# Patient Record
Sex: FEMALE | Race: WHITE | NOT HISPANIC OR LATINO
[De-identification: names, ages, dates, MRNs, and addresses within clinical notes are randomized per-mention and may not be internally consistent; named-entity substitution may affect disease eponyms.]

---

## 2018-12-17 ENCOUNTER — TRANSCRIPTION ENCOUNTER (OUTPATIENT)
Age: 53
End: 2018-12-17

## 2018-12-17 ENCOUNTER — APPOINTMENT (OUTPATIENT)
Dept: ORTHOPEDIC SURGERY | Facility: CLINIC | Age: 53
End: 2018-12-17
Payer: COMMERCIAL

## 2018-12-17 VITALS — WEIGHT: 175 LBS | BODY MASS INDEX: 26.52 KG/M2 | RESPIRATION RATE: 16 BRPM | HEIGHT: 68 IN

## 2018-12-17 DIAGNOSIS — S62.115A NONDISPLACED FRACTURE OF TRIQUETRUM [CUNEIFORM] BONE, LEFT WRIST, INITIAL ENCOUNTER FOR CLOSED FRACTURE: ICD-10-CM

## 2018-12-17 DIAGNOSIS — S62.025A NONDISPLACED FRACTURE OF MIDDLE THIRD OF NAVICULAR [SCAPHOID] BONE OF LEFT WRIST, INITIAL ENCOUNTER FOR CLOSED FRACTURE: ICD-10-CM

## 2018-12-17 DIAGNOSIS — Z78.9 OTHER SPECIFIED HEALTH STATUS: ICD-10-CM

## 2018-12-17 DIAGNOSIS — Z87.09 PERSONAL HISTORY OF OTHER DISEASES OF THE RESPIRATORY SYSTEM: ICD-10-CM

## 2018-12-17 PROBLEM — Z00.00 ENCOUNTER FOR PREVENTIVE HEALTH EXAMINATION: Status: ACTIVE | Noted: 2018-12-17

## 2018-12-17 PROCEDURE — 99203 OFFICE O/P NEW LOW 30 MIN: CPT

## 2018-12-17 PROCEDURE — 73110 X-RAY EXAM OF WRIST: CPT | Mod: 50

## 2018-12-17 RX ORDER — MOMETASONE FUROATE 220 UG/1
220 INHALANT RESPIRATORY (INHALATION)
Refills: 0 | Status: ACTIVE | COMMUNITY

## 2018-12-21 ENCOUNTER — CLINICAL ADVICE (OUTPATIENT)
Age: 53
End: 2018-12-21

## 2022-01-14 ENCOUNTER — NON-APPOINTMENT (OUTPATIENT)
Age: 57
End: 2022-01-14

## 2022-01-14 ENCOUNTER — APPOINTMENT (OUTPATIENT)
Dept: ORTHOPEDIC SURGERY | Facility: CLINIC | Age: 57
End: 2022-01-14
Payer: COMMERCIAL

## 2022-01-14 VITALS — RESPIRATION RATE: 16 BRPM | WEIGHT: 175 LBS | HEIGHT: 68 IN | BODY MASS INDEX: 26.52 KG/M2

## 2022-01-14 PROCEDURE — 29085 APPL CAST HAND&LWR FOREARM: CPT | Mod: RT

## 2022-01-14 PROCEDURE — 99204 OFFICE O/P NEW MOD 45 MIN: CPT | Mod: 25

## 2022-01-14 PROCEDURE — 73110 X-RAY EXAM OF WRIST: CPT | Mod: RT

## 2022-01-14 RX ORDER — TIOTROPIUM BROMIDE 18 UG/1
18 CAPSULE ORAL; RESPIRATORY (INHALATION)
Refills: 0 | Status: ACTIVE | COMMUNITY

## 2022-01-14 RX ORDER — FLUTICASONE FUROATE 50 UG/1
POWDER RESPIRATORY (INHALATION)
Refills: 0 | Status: ACTIVE | COMMUNITY

## 2022-01-15 ENCOUNTER — TRANSCRIPTION ENCOUNTER (OUTPATIENT)
Age: 57
End: 2022-01-15

## 2022-01-18 ENCOUNTER — LABORATORY RESULT (OUTPATIENT)
Age: 57
End: 2022-01-18

## 2022-01-20 ENCOUNTER — TRANSCRIPTION ENCOUNTER (OUTPATIENT)
Age: 57
End: 2022-01-20

## 2022-01-20 RX ORDER — ACETAMINOPHEN AND CODEINE 300; 30 MG/1; MG/1
300-30 TABLET ORAL
Qty: 20 | Refills: 0 | Status: ACTIVE | COMMUNITY
Start: 2022-01-20 | End: 1900-01-01

## 2022-01-21 ENCOUNTER — APPOINTMENT (OUTPATIENT)
Dept: ORTHOPEDIC SURGERY | Facility: AMBULATORY SURGERY CENTER | Age: 57
End: 2022-01-21

## 2022-01-21 ENCOUNTER — OUTPATIENT (OUTPATIENT)
Dept: OUTPATIENT SERVICES | Facility: HOSPITAL | Age: 57
LOS: 1 days | Discharge: HOME | End: 2022-01-21
Payer: COMMERCIAL

## 2022-01-21 PROCEDURE — 25609 OPTX DST RD XART FX/EP SEP3+: CPT | Mod: RT

## 2022-01-21 DEVICE — IMPLANTABLE DEVICE: Type: IMPLANTABLE DEVICE | Site: RIGHT | Status: FUNCTIONAL

## 2022-01-21 DEVICE — GWIRE TROCAR TIP 1.35X150MM: Type: IMPLANTABLE DEVICE | Site: RIGHT | Status: FUNCTIONAL

## 2022-01-21 DEVICE — SCREW LOKG 2.4X16MM: Type: IMPLANTABLE DEVICE | Site: RIGHT | Status: FUNCTIONAL

## 2022-01-21 DEVICE — ARTHREX BB-TAK NON-THREADED: Type: IMPLANTABLE DEVICE | Site: RIGHT | Status: FUNCTIONAL

## 2022-01-21 DEVICE — SCREW CORT LPS 3.5X12MM: Type: IMPLANTABLE DEVICE | Site: RIGHT | Status: FUNCTIONAL

## 2022-01-21 DEVICE — SCREW CORT 3.5X14MM: Type: IMPLANTABLE DEVICE | Site: RIGHT | Status: FUNCTIONAL

## 2022-01-21 DEVICE — SCREW CORT 3.5X16MM: Type: IMPLANTABLE DEVICE | Site: RIGHT | Status: FUNCTIONAL

## 2022-02-04 ENCOUNTER — APPOINTMENT (OUTPATIENT)
Dept: ORTHOPEDIC SURGERY | Facility: CLINIC | Age: 57
End: 2022-02-04
Payer: COMMERCIAL

## 2022-02-04 PROCEDURE — 29085 APPL CAST HAND&LWR FOREARM: CPT | Mod: 58

## 2022-02-04 PROCEDURE — 73110 X-RAY EXAM OF WRIST: CPT | Mod: RT

## 2022-02-04 PROCEDURE — 99024 POSTOP FOLLOW-UP VISIT: CPT

## 2022-03-04 ENCOUNTER — LABORATORY RESULT (OUTPATIENT)
Age: 57
End: 2022-03-04

## 2022-03-04 ENCOUNTER — APPOINTMENT (OUTPATIENT)
Dept: ORTHOPEDIC SURGERY | Facility: CLINIC | Age: 57
End: 2022-03-04
Payer: COMMERCIAL

## 2022-03-04 PROCEDURE — 73130 X-RAY EXAM OF HAND: CPT | Mod: GC,55,LT

## 2022-03-04 PROCEDURE — 99024 POSTOP FOLLOW-UP VISIT: CPT | Mod: GC

## 2022-03-04 RX ORDER — ACETAMINOPHEN AND CODEINE 300; 30 MG/1; MG/1
300-30 TABLET ORAL
Qty: 20 | Refills: 0 | Status: ACTIVE | COMMUNITY
Start: 2022-03-04 | End: 1900-01-01

## 2022-03-04 NOTE — ASU PATIENT PROFILE, ADULT - FALL HARM RISK - RISK INTERVENTIONS

## 2022-03-04 NOTE — ASU PATIENT PROFILE, ADULT - NSICDXPASTSURGICALHX_GEN_ALL_CORE_FT
PAST SURGICAL HISTORY:  H/O kidney donation     History of strabismus surgery     History of surgery on right wrist     History of tonsillectomy     S/P adenoidectomy

## 2022-03-06 ENCOUNTER — TRANSCRIPTION ENCOUNTER (OUTPATIENT)
Age: 57
End: 2022-03-06

## 2022-03-07 ENCOUNTER — APPOINTMENT (OUTPATIENT)
Dept: ORTHOPEDIC SURGERY | Facility: AMBULATORY SURGERY CENTER | Age: 57
End: 2022-03-07

## 2022-03-07 ENCOUNTER — OUTPATIENT (OUTPATIENT)
Dept: OUTPATIENT SERVICES | Facility: HOSPITAL | Age: 57
LOS: 1 days | Discharge: HOME | End: 2022-03-07
Payer: COMMERCIAL

## 2022-03-07 VITALS
TEMPERATURE: 98 F | SYSTOLIC BLOOD PRESSURE: 161 MMHG | HEART RATE: 85 BPM | DIASTOLIC BLOOD PRESSURE: 74 MMHG | RESPIRATION RATE: 16 BRPM | HEIGHT: 67 IN | WEIGHT: 141.1 LBS | OXYGEN SATURATION: 100 %

## 2022-03-07 VITALS
OXYGEN SATURATION: 96 % | DIASTOLIC BLOOD PRESSURE: 64 MMHG | TEMPERATURE: 98 F | SYSTOLIC BLOOD PRESSURE: 124 MMHG | RESPIRATION RATE: 16 BRPM | HEART RATE: 67 BPM

## 2022-03-07 DIAGNOSIS — Z90.89 ACQUIRED ABSENCE OF OTHER ORGANS: Chronic | ICD-10-CM

## 2022-03-07 DIAGNOSIS — Z90.5 ACQUIRED ABSENCE OF KIDNEY: Chronic | ICD-10-CM

## 2022-03-07 DIAGNOSIS — Z98.890 OTHER SPECIFIED POSTPROCEDURAL STATES: Chronic | ICD-10-CM

## 2022-03-07 PROCEDURE — 20680 REMOVAL OF IMPLANT DEEP: CPT | Mod: RT

## 2022-03-07 PROCEDURE — 25295 RELEASE WRIST/FOREARM TENDON: CPT | Mod: RT

## 2022-03-07 RX ORDER — ONDANSETRON 8 MG/1
4 TABLET, FILM COATED ORAL ONCE
Refills: 0 | Status: DISCONTINUED | OUTPATIENT
Start: 2022-03-07 | End: 2022-03-07

## 2022-03-07 RX ORDER — ACETAMINOPHEN 500 MG
650 TABLET ORAL ONCE
Refills: 0 | Status: DISCONTINUED | OUTPATIENT
Start: 2022-03-07 | End: 2022-03-07

## 2022-03-07 NOTE — BRIEF OPERATIVE NOTE - NSICDXBRIEFPROCEDURE_GEN_ALL_CORE_FT
PROCEDURES:  Removal of deeply implanted hardware from hand or wrist 07-Mar-2022 10:49:49  Eric Garcia

## 2022-03-07 NOTE — ASU DISCHARGE PLAN (ADULT/PEDIATRIC) - ASU DC SPECIAL INSTRUCTIONSFT
Hand elevation using gray foam pillow.  Digital, forearm and wrist ROM   Nonweightbearing operative extremity

## 2022-03-07 NOTE — ASU DISCHARGE PLAN (ADULT/PEDIATRIC) - NS MD DC FALL RISK RISK
For information on Fall & Injury Prevention, visit: [email or web address] OR  [email or web address] OR  https://www.cdc.gov/steadi/patient.html

## 2022-03-17 ENCOUNTER — APPOINTMENT (OUTPATIENT)
Dept: ORTHOPEDIC SURGERY | Facility: CLINIC | Age: 57
End: 2022-03-17
Payer: COMMERCIAL

## 2022-03-17 PROCEDURE — 99024 POSTOP FOLLOW-UP VISIT: CPT

## 2022-03-22 PROBLEM — Z00.00 ENCOUNTER FOR PREVENTIVE HEALTH EXAMINATION: Noted: 2022-03-22

## 2022-03-22 RX ORDER — NAPROXEN 500 MG/1
500 TABLET ORAL
Qty: 28 | Refills: 0 | Status: ACTIVE | COMMUNITY
Start: 2022-03-22 | End: 1900-01-01

## 2022-03-24 RX ORDER — FLUTICASONE PROPIONATE 220 MCG
1 AEROSOL WITH ADAPTER (GRAM) INHALATION
Qty: 0 | Refills: 0 | DISCHARGE

## 2022-03-24 RX ORDER — TIOTROPIUM BROMIDE 18 UG/1
2 CAPSULE ORAL; RESPIRATORY (INHALATION)
Qty: 0 | Refills: 0 | DISCHARGE

## 2022-03-25 ENCOUNTER — APPOINTMENT (OUTPATIENT)
Dept: PHYSICAL MEDICINE AND REHAB | Facility: CLINIC | Age: 57
End: 2022-03-25

## 2022-03-29 ENCOUNTER — APPOINTMENT (OUTPATIENT)
Dept: PHYSICAL MEDICINE AND REHAB | Facility: CLINIC | Age: 57
End: 2022-03-29

## 2022-04-13 ENCOUNTER — APPOINTMENT (OUTPATIENT)
Dept: ORTHOPEDIC SURGERY | Facility: CLINIC | Age: 57
End: 2022-04-13
Payer: COMMERCIAL

## 2022-04-13 ENCOUNTER — NON-APPOINTMENT (OUTPATIENT)
Age: 57
End: 2022-04-13

## 2022-04-13 PROBLEM — J45.909 UNSPECIFIED ASTHMA, UNCOMPLICATED: Chronic | Status: ACTIVE | Noted: 2022-03-04

## 2022-04-13 PROCEDURE — 99024 POSTOP FOLLOW-UP VISIT: CPT

## 2022-04-14 ENCOUNTER — APPOINTMENT (OUTPATIENT)
Dept: ORTHOPEDIC SURGERY | Facility: CLINIC | Age: 57
End: 2022-04-14

## 2022-04-19 ENCOUNTER — APPOINTMENT (OUTPATIENT)
Dept: RHEUMATOLOGY | Facility: CLINIC | Age: 57
End: 2022-04-19
Payer: COMMERCIAL

## 2022-04-19 VITALS
BODY MASS INDEX: 21.07 KG/M2 | WEIGHT: 139 LBS | SYSTOLIC BLOOD PRESSURE: 132 MMHG | HEART RATE: 89 BPM | HEIGHT: 68 IN | TEMPERATURE: 96.8 F | OXYGEN SATURATION: 99 % | DIASTOLIC BLOOD PRESSURE: 79 MMHG

## 2022-04-19 DIAGNOSIS — M25.631 STIFFNESS OF RIGHT WRIST, NOT ELSEWHERE CLASSIFIED: ICD-10-CM

## 2022-04-19 DIAGNOSIS — Z13.21 ENCOUNTER FOR SCREENING FOR NUTRITIONAL DISORDER: ICD-10-CM

## 2022-04-19 PROCEDURE — 36415 COLL VENOUS BLD VENIPUNCTURE: CPT

## 2022-04-19 PROCEDURE — 99204 OFFICE O/P NEW MOD 45 MIN: CPT | Mod: 25

## 2022-04-21 ENCOUNTER — TRANSCRIPTION ENCOUNTER (OUTPATIENT)
Age: 57
End: 2022-04-21

## 2022-04-24 NOTE — PHYSICAL EXAM
[General Appearance - Alert] : alert [General Appearance - In No Acute Distress] : in no acute distress [General Appearance - Well Nourished] : well nourished [Sclera] : the sclera and conjunctiva were normal [Respiration, Rhythm And Depth] : normal respiratory rhythm and effort [Exaggerated Use Of Accessory Muscles For Inspiration] : no accessory muscle use [Abnormal Walk] : normal gait [Edema] : there was no peripheral edema [] : no rash [Skin Lesions] : no skin lesions [Oriented To Time, Place, And Person] : oriented to person, place, and time [Impaired Insight] : insight and judgment were intact [Affect] : the affect was normal [FreeTextEntry1] : right hand with edema, hyperpigmentation of the right hand compared to left.  Minimal hyperesthesias at site of previous sutures, decreased handgrip, decreased range of motion of the wrist

## 2022-04-24 NOTE — HISTORY OF PRESENT ILLNESS
[FreeTextEntry1] : 56 year old woman referred by orthopedist for rheumatology evaluation\par Patient fell at home on January 14, as she fell on outstretched hand, resulting in a right distal radius fracture.  Cast was placed at visit on January 14, followed by January 21 for surgical repair, s/p right distal radius ORIF with dorsal fragment specific fixation and a bridge plate. \par March 7 removal of bridge plate\par Following removal of cast, patient developed right hand and wrist swelling associated with pain\par Patient feels hyperesthesia as well, does not recall evidence of sweating, some erythema noted compared to the left\par Started naproxen, blood pressure elevated after a couple of days\par Following with Pain management, tries to avoid nsaids as kidney donor\par Took medrol dose pack\par Felt benefit initially\par When cast off was swollen and stiff\par Plate remains\par \par Feels warm\par Pain management medrol dose pack\par Tried lyrica 50 mg tid but did not help, felt sleepy\par \par Right wrist fracture\par Left wrist fracture about 5 years ago\par Scheduled for bone density\par Menopause about 5 years \par \par HIstory of previous knee injury\par Father with psoriasis, no personal history of psoriasis\par Mother may have had arthritis\par \par taking calcium and vitamin C, magnesium, D\par Annuity and spiriva

## 2022-04-24 NOTE — ASSESSMENT
[FreeTextEntry1] : 56 year old woman referred for rheumatology evaluation.  Patient right hand and wrist edema following right distal radius ORIF (1/21/2022) and subsequent removal of hardware (3/7/2022).  Patient following with pan management for possible CRPS, unable to tolerate gabapentin and lyrica, with improvement with steroid treatment.  Patient feels swelling slowly improving.  Spoke with hand surgeon as well, will further evaluate for possible underlying inflammatory arthropathy given steroid responsiveness to symptoms.  Will check  CBC, CMP, ESR, CRP, RF, CCP, OSWALDO CR, and [id number].  Will also check vitamin D given recent fracture as well B12 given paresthesias.  History of previous wrist fracture as well, bone density pending. Further management pending results, would continue to follow up with pain management as well.

## 2022-04-24 NOTE — DATA REVIEWED
[FreeTextEntry1] : EXAM:  CT RIGHT WRIST WITHOUT CONTRAST\par \par Note - This patient has received 0 CT studies and 0 Myocardial Perfusion studies within our network over the previous 12 month period.\par \par HISTORY:  Hand and wrist fracture status post fall.\par \par TECHNIQUE: Multiple axial CT images were obtained through the right wrist without the use of intravenous contrast. Coronal and sagittal images were reconstructed from the axial data. No 3-D images were generated. One or more of the following dose reduction techniques were used: automated exposure control, adjustment of the mA and/or kV according to patient size, use of iterative reconstruction technique. \par \par COMPARISON:  None.\par \par FINDINGS: \par Osseous structures: Impacted comminuted intra-articular fracture of the distal radius. In the coronal plane, the impaction measures up to 7 mm. In the sagittal plane, the impaction is worst dorsally where it measures approximately 1.2 cm. Comminuted fragments are displaced dorsally along the dorsal aspect of the fracture plane by up to 3 mm. The fracture results in dorsal tilt of the distal radius.\par \par Joints: Mild STT joint space narrowing with osteophyte. Mild spurring at the first carpometacarpal joint.\par Volar dislocation of the pisiform with respect to the triquetrum. The pisiform is displaced anteriorly by 8 mm.\par Effusions in the distal radioulnar, radiocarpal, and pisiform triquetral joints.\par \par Subcutaneous tissues: Subcutaneous edema dorsal hand.\par \par Muscles: No selective muscle atrophy or attenuation abnormality. No mineralization.\par \par IMPRESSION: CT of the right wrist demonstrates: \par 1.  Impacted comminuted intra-articular fracture of the distal radius. Impaction measures a maximum of 1.2 cm in the sagittal plane. Dorsally displaced comminuted fragments along the dorsal aspect of the fracture plane. Dorsal tilt of the distal radius.\par 2.  Mild basilar joint arthrosis.\par 3.  Volar dislocation of the pisiform with respect to the triquetrum.\par 4.  Distal radioulnar, radiocarpal, and pisiform triquetral joint effusions.\par \par Thank you for the opportunity to participate in the care of this patient.

## 2022-04-25 LAB
25(OH)D3 SERPL-MCNC: 48.5 NG/ML
ALBUMIN SERPL ELPH-MCNC: 4.8 G/DL
ALP BLD-CCNC: 81 U/L
ALT SERPL-CCNC: 7 U/L
ANACR T: NEGATIVE
ANION GAP SERPL CALC-SCNC: 12 MMOL/L
AST SERPL-CCNC: 13 U/L
BASOPHILS # BLD AUTO: 0.03 K/UL
BASOPHILS NFR BLD AUTO: 0.5 %
BILIRUB SERPL-MCNC: 0.2 MG/DL
BUN SERPL-MCNC: 12 MG/DL
CALCIUM SERPL-MCNC: 10.3 MG/DL
CCP AB SER IA-ACNC: <8 UNITS
CHLORIDE SERPL-SCNC: 101 MMOL/L
CO2 SERPL-SCNC: 28 MMOL/L
CREAT SERPL-MCNC: 0.87 MG/DL
CRP SERPL-MCNC: <3 MG/L
EGFR: 79 ML/MIN/1.73M2
EOSINOPHIL # BLD AUTO: 0.2 K/UL
EOSINOPHIL NFR BLD AUTO: 3.1 %
ERYTHROCYTE [SEDIMENTATION RATE] IN BLOOD BY WESTERGREN METHOD: 2 MM/HR
GLUCOSE SERPL-MCNC: 112 MG/DL
HCT VFR BLD CALC: 40.9 %
HGB BLD-MCNC: 13 G/DL
IMM GRANULOCYTES NFR BLD AUTO: 0.5 %
LYMPHOCYTES # BLD AUTO: 1.75 K/UL
LYMPHOCYTES NFR BLD AUTO: 27 %
MAN DIFF?: NORMAL
MCHC RBC-ENTMCNC: 30.4 PG
MCHC RBC-ENTMCNC: 31.8 GM/DL
MCV RBC AUTO: 95.6 FL
MONOCYTES # BLD AUTO: 0.5 K/UL
MONOCYTES NFR BLD AUTO: 7.7 %
NEUTROPHILS # BLD AUTO: 3.97 K/UL
NEUTROPHILS NFR BLD AUTO: 61.2 %
PLATELET # BLD AUTO: 241 K/UL
POTASSIUM SERPL-SCNC: 5 MMOL/L
PROT SERPL-MCNC: 6.7 G/DL
RBC # BLD: 4.28 M/UL
RBC # FLD: 12.9 %
RF+CCP IGG SER-IMP: NEGATIVE
RHEUMATOID FACT SER QL: <10 IU/ML
SODIUM SERPL-SCNC: 141 MMOL/L
VIT B12 SERPL-MCNC: 547 PG/ML
WBC # FLD AUTO: 6.48 K/UL

## 2022-04-27 ENCOUNTER — TRANSCRIPTION ENCOUNTER (OUTPATIENT)
Age: 57
End: 2022-04-27

## 2022-04-27 LAB — HLA-B27 RELATED AG QL: NEGATIVE

## 2022-05-03 ENCOUNTER — NON-APPOINTMENT (OUTPATIENT)
Age: 57
End: 2022-05-03

## 2022-05-03 DIAGNOSIS — Z13.820 ENCOUNTER FOR SCREENING FOR OSTEOPOROSIS: ICD-10-CM

## 2022-05-03 DIAGNOSIS — M79.89 OTHER SPECIFIED SOFT TISSUE DISORDERS: ICD-10-CM

## 2022-05-03 DIAGNOSIS — M25.431 EFFUSION, RIGHT WRIST: ICD-10-CM

## 2022-05-03 RX ORDER — PREDNISONE 5 MG/1
5 TABLET ORAL
Qty: 90 | Refills: 2 | Status: ACTIVE | COMMUNITY
Start: 2022-05-03 | End: 1900-01-01

## 2022-05-04 ENCOUNTER — RESULT REVIEW (OUTPATIENT)
Age: 57
End: 2022-05-04

## 2022-05-04 ENCOUNTER — APPOINTMENT (OUTPATIENT)
Dept: RADIOLOGY | Facility: CLINIC | Age: 57
End: 2022-05-04

## 2022-05-04 ENCOUNTER — OUTPATIENT (OUTPATIENT)
Dept: OUTPATIENT SERVICES | Facility: HOSPITAL | Age: 57
LOS: 1 days | End: 2022-05-04
Payer: COMMERCIAL

## 2022-05-04 DIAGNOSIS — Z90.89 ACQUIRED ABSENCE OF OTHER ORGANS: Chronic | ICD-10-CM

## 2022-05-04 DIAGNOSIS — Z98.890 OTHER SPECIFIED POSTPROCEDURAL STATES: Chronic | ICD-10-CM

## 2022-05-04 DIAGNOSIS — Z90.5 ACQUIRED ABSENCE OF KIDNEY: Chronic | ICD-10-CM

## 2022-05-04 PROCEDURE — 73100 X-RAY EXAM OF WRIST: CPT | Mod: 26,RT

## 2022-05-04 PROCEDURE — 73130 X-RAY EXAM OF HAND: CPT | Mod: 26,RT

## 2022-05-05 ENCOUNTER — RESULT REVIEW (OUTPATIENT)
Age: 57
End: 2022-05-05

## 2022-05-05 PROCEDURE — 77080 DXA BONE DENSITY AXIAL: CPT

## 2022-05-05 PROCEDURE — 73130 X-RAY EXAM OF HAND: CPT

## 2022-05-05 PROCEDURE — 77080 DXA BONE DENSITY AXIAL: CPT | Mod: 26

## 2022-05-05 PROCEDURE — 73100 X-RAY EXAM OF WRIST: CPT

## 2022-06-01 ENCOUNTER — OUTPATIENT (OUTPATIENT)
Dept: OUTPATIENT SERVICES | Facility: HOSPITAL | Age: 57
LOS: 1 days | End: 2022-06-01
Payer: COMMERCIAL

## 2022-06-01 ENCOUNTER — APPOINTMENT (OUTPATIENT)
Dept: ORTHOPEDIC SURGERY | Facility: CLINIC | Age: 57
End: 2022-06-01
Payer: OTHER MISCELLANEOUS

## 2022-06-01 ENCOUNTER — RESULT REVIEW (OUTPATIENT)
Age: 57
End: 2022-06-01

## 2022-06-01 VITALS
SYSTOLIC BLOOD PRESSURE: 152 MMHG | WEIGHT: 140 LBS | HEART RATE: 92 BPM | HEIGHT: 68 IN | TEMPERATURE: 98.6 F | OXYGEN SATURATION: 95 % | BODY MASS INDEX: 21.22 KG/M2 | DIASTOLIC BLOOD PRESSURE: 81 MMHG

## 2022-06-01 DIAGNOSIS — M25.561 PAIN IN RIGHT KNEE: ICD-10-CM

## 2022-06-01 DIAGNOSIS — Z90.89 ACQUIRED ABSENCE OF OTHER ORGANS: Chronic | ICD-10-CM

## 2022-06-01 DIAGNOSIS — M25.061 HEMARTHROSIS, RIGHT KNEE: ICD-10-CM

## 2022-06-01 DIAGNOSIS — Z90.5 ACQUIRED ABSENCE OF KIDNEY: Chronic | ICD-10-CM

## 2022-06-01 DIAGNOSIS — Z98.890 OTHER SPECIFIED POSTPROCEDURAL STATES: Chronic | ICD-10-CM

## 2022-06-01 PROCEDURE — 73564 X-RAY EXAM KNEE 4 OR MORE: CPT

## 2022-06-01 PROCEDURE — 99214 OFFICE O/P EST MOD 30 MIN: CPT | Mod: 25

## 2022-06-01 PROCEDURE — 20611 DRAIN/INJ JOINT/BURSA W/US: CPT

## 2022-06-01 PROCEDURE — 73564 X-RAY EXAM KNEE 4 OR MORE: CPT | Mod: 26,50

## 2022-06-01 NOTE — HISTORY OF PRESENT ILLNESS
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  She had difficulty weightbearing after the event, and has been using a rolling walker to ambulate. She recalls a remote ACL rupture in 1998 which she elected to treat nonoperatively, and eventually had an acceptable clinical outcome.  She endorses occasional catching symptoms of the knee prior to injury.  She denies hip pain.\par \par Pain is rated 6/10, described as achy, improved with ice, worse with weightbearing. [6] : a current pain level of 6/10

## 2022-06-01 NOTE — PHYSICAL EXAM
[de-identified] : General: Well-nourished, well-developed, alert, and in no acute distress.\par Head: Normocephalic.\par Eyes: Pupils equal round reactive to light and accommodation, extraocular muscles intact, normal sclera.\par Nose: No nasal discharge.\par Cardiac: Regular rate. Extremities are warm and well perfused. Distal pulses are symmetric bilaterally.\par Respiratory: No labored breathing.\par Extremities: Sensation is intact distally bilaterally.  Distal pulses are symmetric bilaterally\par Lymphatic: No regional lymphadenopathy, no lymphedema\par Neurologic: No focal deficits\par Skin: Normal skin color, texture, and turgor\par Psychiatric: Normal affect\par MSK: as noted above/below\par \par \par \par RIGHT KNEE:\par \par Inspection: no bruising, erythema\par Joint Effusion:MODERATE\par ROM: Knee Flexion 45 WITH PAIN , Knee Extension +5\par Palpation:MEDIAL JOINT LINE PAIN, PERIPATELLAR PAIN, PAIN AT MFC, No pain at patellar tendon, LFC, Medial/Lateral Tibial Plateau\par Leg Length Discrepancy:no\par Patella: no apprehension\par Distal Pulses: normal\par Lower Extremity Strength:KNEE EXTENSION 4+/5, KNEE FLEXION 5-/5\par Lower Extremity Reflexes:normal, 2+\par Lower Extremity Sensation: normal\par \par Special Tests:\par Varus/Valgus:Negative, no instability\par \par LEFT KNEE:\par \par Inspection: no bruising, swelling, erythema\par Joint Effusion:no \par ROM: Knee Flexion 120 , Knee Extension 0\par Palpation:MEDIAL JOINT LINE PAIN, PERIPATELLAR PAIN, No pain at patellar tendon, MFC/LFC, Medial/Lateral Tibial Plateau\par Leg Length Discrepancy:no\par Patella: no apprehension\par Distal Pulses: normal\par Lower Extremity Strength:normal, 5/5 \par Lower Extremity Reflexes:normal, 2+\par Lower Extremity Sensation: normal\par \par  [de-identified] : Xray Bilateral Knees - Multiple views were reviewed with the patient in detail.  There is no acute fracture or dislocation.  There is right joint effusion.  Severe right tricompartment OA with severe medial compartment narrowing and Moderate left knee OA with lateral compartment narrowing.  We will await formal radiology reading.\par \par

## 2022-06-01 NOTE — PROCEDURE
[de-identified] : Ultrasound-Guided RIGHT Knee Arthrocentesis\par \par Indication for U/S Guidance: Ensure placement within the tibiofemoral joint for diagnostic purposes, while avoiding neurovascular structures\par \par Indication for Injection: KNEE EFFUSION\par \par A discussion was had with the patient regarding this procedure and all questions were answered. All risks, benefits and alternatives were discussed. These included but were not limited to bleeding, infection, injection site reaction/complication and allergic reaction. A timeout was done to ensure correct side and pt agreed to the procedure. Betadine was used to sterilize and prep the area, and alcohol was used to clean the skin in the anterior aspect of the knee joint. The suprapatellar space was visualized utilizing the Sonosite, linear transducer. The joint was visualized in the short axis and an in-plane approach was used for the injection. Ultrasound guidance was utilized to ensure accuracy of the intra-articular aspiration, and avoid the neurovascular structures. A 25-gauge 1.5" needle was used to inject 2cc of 1% lidocaine without epi into the SubQ.  This was followed by aspiration with an 18-gauge 1.5" needle with aspiration of 50CC OF HEMARTHROSIS.  A sterile bandage was then applied. The patient tolerated the procedure well and there were no complications.\par

## 2022-06-01 NOTE — DISCUSSION/SUMMARY
[Medication Risks Reviewed] : Medication risks reviewed [de-identified] : The patient is a 56 year old woman presenting with acute, traumatic right knee pain after direct fall on flexed knee.  Knee aspirated today with 50cc of HEMARTHROSIS.  She has underlying posttraumatic knee arthritis.  We will rule out nondisplaced fracture, loose body.\par \par Imaging/Diagnostics/Medical Records were interpreted and/or reviewed and results were reviewed with the patient in detail.  All questions were answered appropriately.\par \par The patient was counseled on the natural progression of the problem(s) today and potential complications of diagnoses.  The patient was provided reassurance today.\par \par MRI of the right knee ordered today.\par \par Patient has acute mechanical symptoms and difficulty weightbearing. IT IS MEDICALLY NECESSARY TO APPROVE THE FOLLOWING DIAGNOSTIC IMAGING.\par \par The patient was counseled on Protection-Rest-Ice-Compression-Elevation (PRICE).\par \par Start gentle supine ROM exercises.\par \par Protected WB, TTWB with walker as tolerated.\par \par Follow-up after MRI.\par \par I explained to the patient that I will be leaving at the end of July 2022.  With established patients, I will continue to provide care during that time.  I explained that the Orthopedic team will be sending out letters regarding my departure and we have provided referrals in-office as requested.\par \par ------------------------------------------------------------------------------------------------------------------\par Patient appreciates and agrees with current plan.\par \par The patient's diagnosis above was evaluated by me, personally.  Diagnostic Testing and treatment options were discussed with the patient in detail.  The risks/benefits/potential complications of diagnostic testing/treatments were described in detail.  \par \par This note was generated using a mixture of manual typing and dragon medical dictation software.  A reasonable effort has been made for proofreading its contents, but typos may still remain.  If there are any questions or points of clarification needed please notify my office.\par \par I explained to the patient that I will be leaving at the end of July 2022.  With established patients, I will continue to provide care during that time.  I explained that the Orthopedic team will be sending out letters regarding my departure and we have provided referrals in-office as requested.\par \par >45 minutes of time was spent on total encounter.  >50% of the visit was spent on face-to-face counseling/coordination of care and medical-decision making for this patient.\par \par

## 2022-06-07 ENCOUNTER — TRANSCRIPTION ENCOUNTER (OUTPATIENT)
Age: 57
End: 2022-06-07

## 2022-06-14 ENCOUNTER — APPOINTMENT (OUTPATIENT)
Dept: ORTHOPEDIC SURGERY | Facility: CLINIC | Age: 57
End: 2022-06-14
Payer: OTHER MISCELLANEOUS

## 2022-06-14 PROCEDURE — 99072 ADDL SUPL MATRL&STAF TM PHE: CPT

## 2022-06-14 PROCEDURE — 99214 OFFICE O/P EST MOD 30 MIN: CPT

## 2022-06-15 ENCOUNTER — APPOINTMENT (OUTPATIENT)
Dept: ORTHOPEDIC SURGERY | Facility: CLINIC | Age: 57
End: 2022-06-15

## 2022-06-15 NOTE — PHYSICAL EXAM
[de-identified] : Right knee\par \par Constitutional: \par The patient is healthy-appearing and in no apparent distress. \par \par Gait:\par The patient ambulates with a limp and walker.\par \par Cardiovascular System: \par The capillary refill is less than 2 seconds. \par \par Skin: \par There are no skin abnormalities other than a mild effusion.\par \par Right Knee:\par  \par Bony Palpation: \par There is tenderness of the medial joint line. \par There is no tenderness of the lateral joint line.\par There is tenderness of the medial femoral chondyle.\par There is no tenderness of the lateral femoral chondyle.\par There is no tenderness of the tibial tubercle.\par There is no tenderness of the superior patella.\par There is no tenderness of the inferior patella.\par There is tenderness of the medial patellar facet.\par There is tenderness of the lateral patellar facet.\par \par Soft Tissue Palpation: \par There is tenderness of the medial retinaculum.\par There is tenderness of the lateral retinaculum.\par There is no tenderness of the quadriceps tendon.\par There is no tenderness of the patella tendon.\par There is no tenderness of the ITB.\par There is no tenderness of the pes anserine.\par \par Active Range of Motion: \par The range of motion at the knee actively and passively is 3 - 115 with pain at end range. \par \par Special Tests: \par There is a negative Apley.\par There is a negative Steinmanns. \par There is a positive Lachman and Anterior Drawer.\par There is a negative Posterior Drawer.  \par There is no varus or valgus laxity.\par \par Strength: \par There is 5/5 hip flexion and 5/5 knee flexion and extension.  \par \par Psychiatric: \par The patient demonstrates a normal mood and affect and is active and alert [de-identified] : MRI of the knee ( [facility] ):  There is diffuse tricompartmental arthritis.  There is a bucket-handle medial meniscus tear and degenerative tearing of the lateral meniscus.  There is a moderate effusion.  There is anterior lateral tibial edema with a small nondisplaced fracture line anteriorly\par \par

## 2022-06-15 NOTE — ASSESSMENT
[FreeTextEntry1] : Discussed at length with patient exam history and imaging and concern for the bone edema in the tibial plateau with a nondisplaced fracture line.  Recommendation at this time would be for continued nonweightbearing for another 2 weeks and follow up in 2 weeks at that time with the expectation to resume weightbearing as tolerated.  Discussed with her at length the pre-existing arthritis and ACL tear as well as a concern for a displaced bucket-handle medial meniscus tear which based on discussed with the patient may have been a chronic issue.  Discussed treatment options for underlying arthritis and meniscus tear patient is time we'll elect to observe her persistent symptoms we'll have discussion in regards to this\par \par

## 2022-06-15 NOTE — HISTORY OF PRESENT ILLNESS
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  States she does have prior baseline discomfort with a known history of arthritis and she does report that she's had intermittent clicking for years with occasional "locking" over the past several years.  She is using a walker and nonweightbearing.  He is present with her  for history and exam

## 2022-06-28 ENCOUNTER — APPOINTMENT (OUTPATIENT)
Dept: ORTHOPEDIC SURGERY | Facility: CLINIC | Age: 57
End: 2022-06-28
Payer: OTHER MISCELLANEOUS

## 2022-06-28 DIAGNOSIS — M17.11 UNILATERAL PRIMARY OSTEOARTHRITIS, RIGHT KNEE: ICD-10-CM

## 2022-06-28 DIAGNOSIS — S82.141A DISPLACED BICONDYLAR FRACTURE OF RIGHT TIBIA, INITIAL ENCOUNTER FOR CLOSED FRACTURE: ICD-10-CM

## 2022-06-28 DIAGNOSIS — M25.561 PAIN IN RIGHT KNEE: ICD-10-CM

## 2022-06-28 PROCEDURE — 99072 ADDL SUPL MATRL&STAF TM PHE: CPT

## 2022-06-28 PROCEDURE — 99213 OFFICE O/P EST LOW 20 MIN: CPT

## 2022-06-28 PROCEDURE — 73562 X-RAY EXAM OF KNEE 3: CPT | Mod: RT

## 2022-06-28 NOTE — DISCUSSION/SUMMARY
[de-identified] : Discussed at length with patient overall clinical improvement and imaging.  Recommend begin WBAT and patient elects home exercise as well.  Pateint may return to work July 5th.  Follow-up in 5-6 weeks as needed.

## 2022-06-28 NOTE — HISTORY OF PRESENT ILLNESS
[No] : The patient is currently not working. [de-identified] : Patient is an established patient seen two weeks ago  for RIGHT knee tibial fracture/edema, arthritis.  States NWB with a walker and overall much improved.

## 2022-06-28 NOTE — PHYSICAL EXAM
[de-identified] : Right knee\par \par Constitutional: \par The patient is healthy-appearing and in no apparent distress. \par \par Gait:\par The patient ambulates with a walker.\par \par Cardiovascular System: \par The capillary refill is less than 2 seconds. \par \par Skin: \par There are no skin abnormalities other than a mild effusion.\par \par Right Knee:\par  \par Bony Palpation: \par There is no tenderness of the medial joint line. \par There is no tenderness of the lateral joint line.\par There is no tenderness of the medial femoral chondyle.\par There is no tenderness of the lateral femoral chondyle.\par There is no tenderness of the tibial tubercle.\par There is no tenderness of the superior patella.\par There is no tenderness of the inferior patella.\par There is no tenderness of the medial patellar facet.\par There is no tenderness of the lateral patellar facet.\par \par Soft Tissue Palpation: \par There is mild tenderness of the medial retinaculum.\par There is no tenderness of the lateral retinaculum.\par There is no tenderness of the quadriceps tendon.\par There is no tenderness of the patella tendon.\par There is no tenderness of the ITB.\par There is no tenderness of the pes anserine.\par \par Active Range of Motion: \par The range of motion at the knee actively and passively is 3 - 130 with pain at end range.\par \par Strength: \par There is 5/5 hip flexion and 5/5 knee flexion and extension.  \par \par Psychiatric: \par The patient demonstrates a normal mood and affect and is active and alert [de-identified] : X-ray RIGHT knee:  There is medial and patellofemoral arthritis.  There is callus at the anterior proximal tibia.\par

## 2022-07-01 ENCOUNTER — APPOINTMENT (OUTPATIENT)
Dept: ORTHOPEDIC SURGERY | Facility: CLINIC | Age: 57
End: 2022-07-01

## 2022-07-01 PROCEDURE — 73110 X-RAY EXAM OF WRIST: CPT | Mod: RT

## 2022-07-01 PROCEDURE — 99213 OFFICE O/P EST LOW 20 MIN: CPT

## 2022-07-26 ENCOUNTER — APPOINTMENT (OUTPATIENT)
Dept: ORTHOPEDIC SURGERY | Facility: CLINIC | Age: 57
End: 2022-07-26

## 2022-11-04 ENCOUNTER — APPOINTMENT (OUTPATIENT)
Dept: ORTHOPEDIC SURGERY | Facility: CLINIC | Age: 57
End: 2022-11-04

## 2022-11-04 DIAGNOSIS — S69.91XA UNSPECIFIED INJURY OF RIGHT WRIST, HAND AND FINGER(S), INITIAL ENCOUNTER: ICD-10-CM

## 2022-11-04 PROCEDURE — 73110 X-RAY EXAM OF WRIST: CPT | Mod: RT

## 2022-11-04 PROCEDURE — 99213 OFFICE O/P EST LOW 20 MIN: CPT

## 2023-10-18 NOTE — BRIEF OPERATIVE NOTE - NSICDXBRIEFPOSTOP_GEN_ALL_CORE_FT
POST-OP DIAGNOSIS:  Orthopedic hardware present 07-Mar-2022 10:50:01  Eric Garcia   complains of pain/discomfort

## 2024-03-29 ENCOUNTER — OFFICE (OUTPATIENT)
Dept: URBAN - METROPOLITAN AREA CLINIC 92 | Facility: CLINIC | Age: 59
Setting detail: OPHTHALMOLOGY
End: 2024-03-29
Payer: COMMERCIAL

## 2024-03-29 ENCOUNTER — RX ONLY (RX ONLY)
Age: 59
End: 2024-03-29

## 2024-03-29 DIAGNOSIS — H02.89: ICD-10-CM

## 2024-03-29 DIAGNOSIS — H35.412: ICD-10-CM

## 2024-03-29 DIAGNOSIS — H52.4: ICD-10-CM

## 2024-03-29 DIAGNOSIS — H50.89: ICD-10-CM

## 2024-03-29 DIAGNOSIS — H25.13: ICD-10-CM

## 2024-03-29 PROBLEM — H52.7 REFRACTIVE ERROR ; BOTH EYES: Status: ACTIVE | Noted: 2024-03-29

## 2024-03-29 PROCEDURE — 92015 DETERMINE REFRACTIVE STATE: CPT | Performed by: SPECIALIST

## 2024-03-29 PROCEDURE — 92250 FUNDUS PHOTOGRAPHY W/I&R: CPT | Performed by: SPECIALIST

## 2024-03-29 PROCEDURE — 92004 COMPRE OPH EXAM NEW PT 1/>: CPT | Performed by: SPECIALIST

## 2024-03-29 ASSESSMENT — REFRACTION_MANIFEST
OS_CYLINDER: +1.50
OD_CYLINDER: +1.25
OS_VA1: 20/30
OD_VA1: 20/30
OD_AXIS: 175
OS_AXIS: 170
OS_ADD: +2.25
OS_SPHERE: -2.50
OD_SPHERE: -2.25
OD_ADD: +2.25

## 2024-03-29 ASSESSMENT — REFRACTION_CURRENTRX
OD_CYLINDER: +0.75
OD_AXIS: 175
OD_VPRISM_DIRECTION: PROGS
OS_OVR_VA: 20/
OS_AXIS: 176
OS_VPRISM_DIRECTION: PROGS
OS_SPHERE: -2.50
OD_ADD: +1.75
OS_ADD: +1.75
OD_OVR_VA: 20/
OS_CYLINDER: +1.25
OD_SPHERE: -2.25

## 2024-03-29 ASSESSMENT — LID EXAM ASSESSMENTS
OS_MEIBOMITIS: LUL 1+ 2+
OD_MEIBOMITIS: RUL 1+ 2+

## 2024-03-29 ASSESSMENT — SPHEQUIV_DERIVED
OS_SPHEQUIV: -1.75
OD_SPHEQUIV: -1.625

## 2025-05-05 ENCOUNTER — OFFICE (OUTPATIENT)
Dept: URBAN - METROPOLITAN AREA CLINIC 92 | Facility: CLINIC | Age: 60
Setting detail: OPHTHALMOLOGY
End: 2025-05-05
Payer: COMMERCIAL

## 2025-05-05 DIAGNOSIS — H02.89: ICD-10-CM

## 2025-05-05 DIAGNOSIS — H25.13: ICD-10-CM

## 2025-05-05 DIAGNOSIS — H35.412: ICD-10-CM

## 2025-05-05 DIAGNOSIS — H50.89: ICD-10-CM

## 2025-05-05 PROCEDURE — 92014 COMPRE OPH EXAM EST PT 1/>: CPT | Performed by: SPECIALIST

## 2025-05-05 ASSESSMENT — TONOMETRY
OD_IOP_MMHG: 14
OS_IOP_MMHG: 14

## 2025-05-05 ASSESSMENT — CONFRONTATIONAL VISUAL FIELD TEST (CVF)
OD_FINDINGS: FULL
OS_FINDINGS: FULL

## 2025-05-05 ASSESSMENT — LID EXAM ASSESSMENTS
OD_MEIBOMITIS: RUL 1+ 2+
OS_MEIBOMITIS: LUL 1+ 2+

## 2025-05-06 ASSESSMENT — REFRACTION_CURRENTRX
OD_SPHERE: -1.00
OS_OVR_VA: 20/
OD_AXIS: 084
OS_CYLINDER: -1.50
OS_VPRISM_DIRECTION: PROGS
OD_CYLINDER: -1.25
OD_SPHERE: -2.25
OS_AXIS: 069
OD_VPRISM_DIRECTION: PROGS
OS_SPHERE: -1.00
OD_CYLINDER: +0.75
OD_ADD: +1.75
OS_VPRISM_DIRECTION: PROGS
OS_ADD: +3.25
OD_VPRISM_DIRECTION: PROGS
OS_SPHERE: -2.50
OS_ADD: +1.75
OS_AXIS: 176
OS_OVR_VA: 20/
OD_ADD: +2.50
OD_OVR_VA: 20/
OS_CYLINDER: +1.25
OD_AXIS: 175
OD_OVR_VA: 20/

## 2025-05-06 ASSESSMENT — REFRACTION_MANIFEST
OD_AXIS: 175
OS_AXIS: 170
OD_VA1: 20/30
OS_CYLINDER: +1.50
OS_ADD: +2.25
OD_CYLINDER: +1.25
OS_VA1: 20/30
OS_SPHERE: -2.50
OD_ADD: +2.25
OD_SPHERE: -2.25

## 2025-05-06 ASSESSMENT — REFRACTION_AUTOREFRACTION
OS_CYLINDER: +1.75
OD_CYLINDER: +1.00
OD_SPHERE: -2.25
OS_AXIS: 165
OD_AXIS: 180
OS_SPHERE: -2.50

## 2025-05-06 ASSESSMENT — KERATOMETRY
OS_K2POWER_DIOPTERS: 43.75
METHOD_AUTO_MANUAL: AUTO
OD_K1POWER_DIOPTERS: 42.56
OS_AXISANGLE_DEGREES: 161
OD_K2POWER_DIOPTERS: 43.00
OS_K1POWER_DIOPTERS: 43.00
OD_AXISANGLE_DEGREES: 026

## 2025-05-06 ASSESSMENT — VISUAL ACUITY
OS_BCVA: 20/20
OD_BCVA: 20/20

## (undated) DEVICE — TOURNIQUET CUFF 34" DUAL PORT W PLC

## (undated) DEVICE — TUBING SUCTION NONCONDUCTIVE 6MM X 12FT

## (undated) DEVICE — SUT VICRYL 3-0 18" PS-1 UNDYED

## (undated) DEVICE — BIPOLAR FORCEP KIRWAN JEWELERS STR 4" X 0.4MM W 12FT CORD (GREEN)

## (undated) DEVICE — DRAPE LIGHT HANDLE COVER (GREEN)

## (undated) DEVICE — VENODYNE/SCD SLEEVE CALF MEDIUM

## (undated) DEVICE — DRSG XEROFORM 1 X 8"

## (undated) DEVICE — WARMING BLANKET LOWER ADULT

## (undated) DEVICE — SUT MONOCRYL 3-0 18" PS-1

## (undated) DEVICE — SUT ETHILON 5-0 18" P-3

## (undated) DEVICE — BUR MICROAIRE CARBIDE ROUND 4MM

## (undated) DEVICE — GLV 8.5 PROTEXIS (WHITE)

## (undated) DEVICE — PACK HAND

## (undated) DEVICE — SUT NYLON 3-0 18" PS-2

## (undated) DEVICE — DRSG ACE BANDAGE 4"

## (undated) DEVICE — SUT VICRYL 2-0 27" CT-1 UNDYED

## (undated) DEVICE — SUT SILK 4-0 18" PS-2

## (undated) DEVICE — SAW BLADE STRYKER PRECISION 9X0.51X31MM

## (undated) DEVICE — PACK ORTHO ELBOW HAND

## (undated) DEVICE — DRSG KERLIX ROLL 4.5"

## (undated) DEVICE — DRSG COMBINE 5X9"

## (undated) DEVICE — DRSG CAST PLASTER XFAST 4"

## (undated) DEVICE — MARKING PEN W RULER

## (undated) DEVICE — DRAPE 3/4 SHEET 52X76"

## (undated) DEVICE — DRILL BIT ARTHREX 1.7MM

## (undated) DEVICE — TOURNIQUET CUFF 18" DUAL PORT SINGLE BLADDER W PLC  (BLACK)

## (undated) DEVICE — DRAPE C ARM MINI

## (undated) DEVICE — TOURNIQUET CUFF 42" DUAL PORT W PLC

## (undated) DEVICE — SLV COMPRESSION KNEE MED

## (undated) DEVICE — GLV 8 PROTEXIS (WHITE)

## (undated) DEVICE — DRILL BIT ARTHREX 2.5MM

## (undated) DEVICE — DRSG WEBRIL 4"